# Patient Record
Sex: FEMALE | Race: WHITE | HISPANIC OR LATINO | Employment: UNEMPLOYED | URBAN - METROPOLITAN AREA
[De-identification: names, ages, dates, MRNs, and addresses within clinical notes are randomized per-mention and may not be internally consistent; named-entity substitution may affect disease eponyms.]

---

## 2019-12-24 ENCOUNTER — HOSPITAL ENCOUNTER (EMERGENCY)
Facility: HOSPITAL | Age: 24
Discharge: HOME | End: 2019-12-24
Attending: EMERGENCY MEDICINE | Admitting: EMERGENCY MEDICINE
Payer: MEDICAID

## 2019-12-24 VITALS
TEMPERATURE: 98.4 F | SYSTOLIC BLOOD PRESSURE: 109 MMHG | WEIGHT: 210 LBS | OXYGEN SATURATION: 97 % | RESPIRATION RATE: 18 BRPM | HEIGHT: 63 IN | BODY MASS INDEX: 37.21 KG/M2 | DIASTOLIC BLOOD PRESSURE: 69 MMHG | HEART RATE: 91 BPM

## 2019-12-24 DIAGNOSIS — R50.9 ACUTE FEBRILE ILLNESS: ICD-10-CM

## 2019-12-24 DIAGNOSIS — J06.9 VIRAL URI: Primary | ICD-10-CM

## 2019-12-24 DIAGNOSIS — R09.81 NASAL CONGESTION: ICD-10-CM

## 2019-12-24 DIAGNOSIS — J02.9 SORE THROAT: ICD-10-CM

## 2019-12-24 DIAGNOSIS — J11.1 INFLUENZA-LIKE SYNDROME: ICD-10-CM

## 2019-12-24 DIAGNOSIS — B34.9 VIRAL SYNDROME: ICD-10-CM

## 2019-12-24 LAB — S PYO DNA THROAT QL NAA+PROBE: NORMAL

## 2019-12-24 PROCEDURE — 99283 EMERGENCY DEPT VISIT LOW MDM: CPT | Performed by: EMERGENCY MEDICINE

## 2019-12-24 PROCEDURE — 87651 STREP A DNA AMP PROBE: CPT | Performed by: EMERGENCY MEDICINE

## 2019-12-24 PROCEDURE — 99283 EMERGENCY DEPT VISIT LOW MDM: CPT

## 2019-12-24 RX ORDER — FLUTICASONE PROPIONATE 50 MCG
1 SPRAY, SUSPENSION (ML) NASAL 2 TIMES DAILY PRN
Qty: 16 G | Refills: 0 | Status: SHIPPED | OUTPATIENT
Start: 2019-12-24

## 2019-12-24 RX ORDER — IBUPROFEN 600 MG/1
600 TABLET ORAL ONCE
Status: COMPLETED | OUTPATIENT
Start: 2019-12-24 | End: 2019-12-24

## 2019-12-24 RX ADMIN — DEXAMETHASONE SODIUM PHOSPHATE 10 MG: 10 INJECTION, SOLUTION INTRAMUSCULAR; INTRAVENOUS at 12:07

## 2019-12-24 RX ADMIN — IBUPROFEN 600 MG: 600 TABLET, FILM COATED ORAL at 12:07

## 2019-12-24 NOTE — DISCHARGE INSTRUCTIONS
Take the nasal spray as per the instructions  Use a saline or salt water nasal spray to help with your congestion  Continue to take ibuprofen (Motrin, Advil) or acetaminophen (Tylenol) as needed for pain and fevers, as per the instructions  Use over-the-counter cough cold medications to help with her symptoms  Many of these do contain Tylenol, so be careful not to take more than 4000 milligrams (4 grams) from all sources in a 24 hour period  Use honey and salt water gargles, as well as throat sprays and lozenges to help with your sore throat  Drink plenty of fluids, and eat as you are able to tolerate  Follow up with your primary care doctor to make sure that you are doing better  Return if you develop worsening pain or swelling in your throat such that you are unable to swallow, for any other concerns

## 2019-12-24 NOTE — ED PROVIDER NOTES
History  Chief Complaint   Patient presents with    Flu Symptoms     sore throat x 1 day; fever and chills at home; taking tylenol at home     HPI    None       No past medical history on file  Past Surgical History:   Procedure Laterality Date    WISDOM TOOTH EXTRACTION         No family history on file  I have reviewed and agree with the history as documented  Social History     Tobacco Use    Smoking status: Never Smoker    Smokeless tobacco: Never Used   Substance Use Topics    Alcohol use: Yes     Comment: occasional    Drug use: Not on file        Review of Systems    Physical Exam  Physical Exam   Constitutional: She is oriented to person, place, and time  She appears well-developed and well-nourished  No distress  obese   HENT:   Head: Normocephalic and atraumatic  Right Ear: Tympanic membrane, external ear and ear canal normal    Left Ear: Tympanic membrane, external ear and ear canal normal    Nose: Mucosal edema and rhinorrhea present  Mouth/Throat: Mucous membranes are normal  No oral lesions  No trismus in the jaw  Posterior oropharyngeal erythema present  No oropharyngeal exudate, posterior oropharyngeal edema or tonsillar abscesses  Tonsils are 0 on the right  Tonsils are 0 on the left  No tonsillar exudate  Eyes: Pupils are equal, round, and reactive to light  Conjunctivae are normal    Neck: Normal range of motion  Neck supple  Cardiovascular: Regular rhythm, normal heart sounds and intact distal pulses  Tachycardia present  Pulmonary/Chest: Effort normal and breath sounds normal  No respiratory distress  Abdominal: Soft  She exhibits no distension  There is no tenderness  Lymphadenopathy:     She has no cervical adenopathy  Neurological: She is alert and oriented to person, place, and time  GCS eye subscore is 4  GCS verbal subscore is 5  GCS motor subscore is 6  Skin: Skin is warm and dry  No rash noted  Psychiatric: She has a normal mood and affect   Her behavior is normal    Nursing note and vitals reviewed  Vital Signs  ED Triage Vitals   Temperature Pulse Respirations Blood Pressure SpO2   12/24/19 1128 12/24/19 1128 12/24/19 1128 12/24/19 1128 12/24/19 1128   98 4 °F (36 9 °C) (!) 117 20 127/73 95 %      Temp Source Heart Rate Source Patient Position - Orthostatic VS BP Location FiO2 (%)   12/24/19 1128 12/24/19 1128 12/24/19 1128 12/24/19 1128 --   Oral Monitor Sitting Left arm       Pain Score       12/24/19 1230       No Pain           Vitals:    12/24/19 1128 12/24/19 1215 12/24/19 1230   BP: 127/73 117/72 109/69   Pulse: (!) 117 89 91   Patient Position - Orthostatic VS: Sitting Sitting Lying         Visual Acuity      ED Medications  Medications   dexamethasone 10 mg/mL oral liquid 10 mg 1 mL (10 mg Oral Given 12/24/19 1207)   ibuprofen (MOTRIN) tablet 600 mg (600 mg Oral Given 12/24/19 1207)       Diagnostic Studies  Results Reviewed     Procedure Component Value Units Date/Time    Strep A PCR [[id number]]  (Normal) Collected:  12/24/19 1208    Lab Status:  Final result Specimen:  Throat Updated:  12/24/19 1244     STREP A PCR None Detected                 No orders to display              Procedures  Procedures         ED Course                               MDM  Number of Diagnoses or Management Options  Acute febrile illness: new and requires workup  Influenza-like syndrome: new and requires workup  Nasal congestion: new and requires workup  Sore throat: new and requires workup  Viral syndrome: new and requires workup  Viral URI: new and requires workup  Diagnosis management comments: This is a 23-year-old female who presents here today with URI symptoms  She began getting sick yesterday, with fevers up to 102 at home  She endorses significant nasal congestion, and has a mild cough  She feels like she cannot breathe through her nose, but is not short of breath  She endorses chills but no myalgias    She has sore throat with pain while swallowing, but no difficulties swallowing  She feels like her ears are clogged, such that she has to pinch her nose shut to try and "pop" her ears, but denies any actual ear pain  She states her daughter had influenza about a week ago but denies any other known sick contacts  She has tried throat spray which is intermittently helpful as well as Tylenol for her fevers  She has not taken any decongestants or anything to help with her nasal congestion  She denies any nausea, vomiting, other areas of pain, urinary symptoms, no rash  She denies underlying medical problems  She states she is out of town on vacation, so was unable to see her primary care doctor, and states she called the local urgent care centers, none of home who took her insurance, so came here for evaluation  Review of systems:  Otherwise negative unless stated as above    She is well-appearing, in no acute distress  She has significant nasal congestion with mucosal edema  She has mild or pharyngeal erythema without edema or exudates  She is afebrile here but is mildly tachycardic  Exam is otherwise unremarkable  This is most likely viral syndrome causing her symptoms  However, given sore throat and erythema we will check a strep PCR to evaluate this is an etiology  This is potentially influenza, however she is otherwise low risk for serious complications, and after discussion decided that treatment with Tamiflu and outweigh benefit is  We will treat her with steroids and ibuprofen for her sore throat  Strep is negative  The patient has had improvement of symptoms while here  I discussed the findings, continued symptomatic treatment at, follow-up, indications for return, and she expresses understanding with this plan         Amount and/or Complexity of Data Reviewed  Clinical lab tests: ordered and reviewed          Disposition  Final diagnoses:   Viral URI   Nasal congestion   Sore throat   Acute febrile illness   Viral syndrome   Influenza-like syndrome     Time reflects when diagnosis was documented in both MDM as applicable and the Disposition within this note     Time User Action Codes Description Comment    12/24/2019 12:59 PM Sumaya Prince Add [J06 9] Viral URI     12/24/2019 12:59 PM Sumaya Prince Add [R09 81] Nasal congestion     12/24/2019 12:59 PM Sumaya Prince Add [J02 9] Sore throat     12/24/2019  1:00 PM Sumaya Prince Add [R50 9] Acute febrile illness     12/24/2019  1:00 PM Sumaya Prince Add [B34 9] Viral syndrome     12/24/2019  1:00 PM Sumaya Prince Add [J11 1] Influenza-like syndrome       ED Disposition     ED Disposition Condition Date/Time Comment    Discharge Good Tue Dec 24, 2019 12:58 PM [address] discharge to home/self care  Follow-up Information     Follow up With Specialties Details Why Contact Info    your primary care doctor  Schedule an appointment as soon as possible for a visit  As needed, for further evaluation of your symptoms           Patient's Medications   Discharge Prescriptions    FLUTICASONE (FLONASE) 50 MCG/ACT NASAL SPRAY    1 spray into each nostril 2 (two) times a day as needed for rhinitis       Start Date: 12/24/2019End Date: --       Order Dose: 1 spray       Quantity: 16 g    Refills: 0     No discharge procedures on file      ED Provider  Electronically Signed by           Leno Engle MD  12/24/19 5610